# Patient Record
Sex: MALE | Race: WHITE | Employment: FULL TIME | URBAN - METROPOLITAN AREA
[De-identification: names, ages, dates, MRNs, and addresses within clinical notes are randomized per-mention and may not be internally consistent; named-entity substitution may affect disease eponyms.]

---

## 2022-01-27 ENCOUNTER — HOSPITAL ENCOUNTER (EMERGENCY)
Age: 32
Discharge: HOME | End: 2022-01-28
Attending: STUDENT IN AN ORGANIZED HEALTH CARE EDUCATION/TRAINING PROGRAM

## 2022-01-27 ENCOUNTER — APPOINTMENT (OUTPATIENT)
Dept: CT IMAGING | Age: 32
End: 2022-01-27
Attending: PHYSICIAN ASSISTANT

## 2022-01-27 VITALS
OXYGEN SATURATION: 99 % | SYSTOLIC BLOOD PRESSURE: 145 MMHG | TEMPERATURE: 97.7 F | BODY MASS INDEX: 24.52 KG/M2 | HEART RATE: 99 BPM | HEIGHT: 73 IN | DIASTOLIC BLOOD PRESSURE: 89 MMHG | RESPIRATION RATE: 16 BRPM | WEIGHT: 185 LBS

## 2022-01-27 DIAGNOSIS — N13.2 HYDRONEPHROSIS WITH URINARY OBSTRUCTION DUE TO URETERAL CALCULUS: Primary | ICD-10-CM

## 2022-01-27 DIAGNOSIS — N17.9 AKI (ACUTE KIDNEY INJURY) (HCC): ICD-10-CM

## 2022-01-27 LAB
ALBUMIN SERPL-MCNC: 4.3 G/DL (ref 3.4–5)
ALBUMIN/GLOB SERPL: 1 {RATIO} (ref 0.8–1.7)
ALP SERPL-CCNC: 89 U/L (ref 45–117)
ALT SERPL-CCNC: 20 U/L (ref 16–61)
ANION GAP SERPL CALC-SCNC: 6 MMOL/L (ref 3–18)
APPEARANCE UR: ABNORMAL
AST SERPL-CCNC: 10 U/L (ref 10–38)
BACTERIA URNS QL MICRO: ABNORMAL /HPF
BASOPHILS # BLD: 0 K/UL (ref 0–0.1)
BASOPHILS NFR BLD: 0 % (ref 0–2)
BILIRUB SERPL-MCNC: 1.9 MG/DL (ref 0.2–1)
BILIRUB UR QL: NEGATIVE
BUN SERPL-MCNC: 18 MG/DL (ref 7–18)
BUN/CREAT SERPL: 11 (ref 12–20)
CALCIUM SERPL-MCNC: 9.8 MG/DL (ref 8.5–10.1)
CHLORIDE SERPL-SCNC: 100 MMOL/L (ref 100–111)
CO2 SERPL-SCNC: 29 MMOL/L (ref 21–32)
COLOR UR: ABNORMAL
CREAT SERPL-MCNC: 1.7 MG/DL (ref 0.6–1.3)
DIFFERENTIAL METHOD BLD: ABNORMAL
EOSINOPHIL # BLD: 0 K/UL (ref 0–0.4)
EOSINOPHIL NFR BLD: 0 % (ref 0–5)
EPITH CASTS URNS QL MICRO: ABNORMAL /LPF (ref 0–5)
ERYTHROCYTE [DISTWIDTH] IN BLOOD BY AUTOMATED COUNT: 12 % (ref 11.6–14.5)
GLOBULIN SER CALC-MCNC: 4.3 G/DL (ref 2–4)
GLUCOSE SERPL-MCNC: 99 MG/DL (ref 74–99)
GLUCOSE UR STRIP.AUTO-MCNC: NEGATIVE MG/DL
GRAN CASTS URNS QL MICRO: ABNORMAL /LPF
HCT VFR BLD AUTO: 46 % (ref 36–48)
HGB BLD-MCNC: 15.8 G/DL (ref 13–16)
HGB UR QL STRIP: ABNORMAL
HYALINE CASTS URNS QL MICRO: ABNORMAL /LPF (ref 0–2)
IMM GRANULOCYTES # BLD AUTO: 0 K/UL
IMM GRANULOCYTES NFR BLD AUTO: 0 %
KETONES UR QL STRIP.AUTO: 40 MG/DL
LEUKOCYTE ESTERASE UR QL STRIP.AUTO: ABNORMAL
LIPASE SERPL-CCNC: 50 U/L (ref 73–393)
LYMPHOCYTES # BLD: 2 K/UL (ref 0.9–3.6)
LYMPHOCYTES NFR BLD: 12 % (ref 21–52)
MCH RBC QN AUTO: 30.5 PG (ref 24–34)
MCHC RBC AUTO-ENTMCNC: 34.3 G/DL (ref 31–37)
MCV RBC AUTO: 88.8 FL (ref 78–100)
MONOCYTES # BLD: 1.2 K/UL (ref 0.05–1.2)
MONOCYTES NFR BLD: 7 % (ref 3–10)
NEUTS SEG # BLD: 13.7 K/UL (ref 1.8–8)
NEUTS SEG NFR BLD: 81 % (ref 40–73)
NITRITE UR QL STRIP.AUTO: NEGATIVE
NRBC # BLD: 0 K/UL (ref 0–0.01)
NRBC BLD-RTO: 0 PER 100 WBC
PH UR STRIP: 5.5 [PH] (ref 5–8)
PLATELET # BLD AUTO: 305 K/UL (ref 135–420)
PLATELET COMMENTS,PCOM: ABNORMAL
PMV BLD AUTO: 10.2 FL (ref 9.2–11.8)
POTASSIUM SERPL-SCNC: 3.9 MMOL/L (ref 3.5–5.5)
PROT SERPL-MCNC: 8.6 G/DL (ref 6.4–8.2)
PROT UR STRIP-MCNC: ABNORMAL MG/DL
RBC # BLD AUTO: 5.18 M/UL (ref 4.35–5.65)
RBC #/AREA URNS HPF: ABNORMAL /HPF (ref 0–5)
RBC MORPH BLD: ABNORMAL
SARS-COV-2, COV2: NORMAL
SODIUM SERPL-SCNC: 135 MMOL/L (ref 136–145)
SP GR UR REFRACTOMETRY: 1.03 (ref 1–1.03)
UROBILINOGEN UR QL STRIP.AUTO: 1 EU/DL (ref 0.2–1)
WBC # BLD AUTO: 16.9 K/UL (ref 4.6–13.2)
WBC URNS QL MICRO: ABNORMAL /HPF (ref 0–5)

## 2022-01-27 PROCEDURE — 74177 CT ABD & PELVIS W/CONTRAST: CPT

## 2022-01-27 PROCEDURE — 99284 EMERGENCY DEPT VISIT MOD MDM: CPT

## 2022-01-27 PROCEDURE — 74011250636 HC RX REV CODE- 250/636: Performed by: PHYSICIAN ASSISTANT

## 2022-01-27 PROCEDURE — 80053 COMPREHEN METABOLIC PANEL: CPT

## 2022-01-27 PROCEDURE — U0003 INFECTIOUS AGENT DETECTION BY NUCLEIC ACID (DNA OR RNA); SEVERE ACUTE RESPIRATORY SYNDROME CORONAVIRUS 2 (SARS-COV-2) (CORONAVIRUS DISEASE [COVID-19]), AMPLIFIED PROBE TECHNIQUE, MAKING USE OF HIGH THROUGHPUT TECHNOLOGIES AS DESCRIBED BY CMS-2020-01-R: HCPCS

## 2022-01-27 PROCEDURE — 87086 URINE CULTURE/COLONY COUNT: CPT

## 2022-01-27 PROCEDURE — 83690 ASSAY OF LIPASE: CPT

## 2022-01-27 PROCEDURE — 74011000636 HC RX REV CODE- 636: Performed by: STUDENT IN AN ORGANIZED HEALTH CARE EDUCATION/TRAINING PROGRAM

## 2022-01-27 PROCEDURE — 87077 CULTURE AEROBIC IDENTIFY: CPT

## 2022-01-27 PROCEDURE — 85025 COMPLETE CBC W/AUTO DIFF WBC: CPT

## 2022-01-27 PROCEDURE — 74011000258 HC RX REV CODE- 258: Performed by: PHYSICIAN ASSISTANT

## 2022-01-27 PROCEDURE — 87186 SC STD MICRODIL/AGAR DIL: CPT

## 2022-01-27 PROCEDURE — 96365 THER/PROPH/DIAG IV INF INIT: CPT

## 2022-01-27 PROCEDURE — 81001 URINALYSIS AUTO W/SCOPE: CPT

## 2022-01-27 PROCEDURE — 74011000250 HC RX REV CODE- 250: Performed by: PHYSICIAN ASSISTANT

## 2022-01-27 PROCEDURE — 96375 TX/PRO/DX INJ NEW DRUG ADDON: CPT

## 2022-01-27 PROCEDURE — 96376 TX/PRO/DX INJ SAME DRUG ADON: CPT

## 2022-01-27 RX ORDER — KETOROLAC TROMETHAMINE 15 MG/ML
15 INJECTION, SOLUTION INTRAMUSCULAR; INTRAVENOUS
Status: COMPLETED | OUTPATIENT
Start: 2022-01-27 | End: 2022-01-27

## 2022-01-27 RX ORDER — DICYCLOMINE HYDROCHLORIDE 10 MG/1
10 CAPSULE ORAL
Status: DISCONTINUED | OUTPATIENT
Start: 2022-01-27 | End: 2022-01-28 | Stop reason: HOSPADM

## 2022-01-27 RX ORDER — MORPHINE SULFATE 4 MG/ML
4 INJECTION INTRAVENOUS
Status: COMPLETED | OUTPATIENT
Start: 2022-01-27 | End: 2022-01-27

## 2022-01-27 RX ORDER — ONDANSETRON 2 MG/ML
4 INJECTION INTRAMUSCULAR; INTRAVENOUS
Status: COMPLETED | OUTPATIENT
Start: 2022-01-27 | End: 2022-01-27

## 2022-01-27 RX ADMIN — MORPHINE SULFATE 4 MG: 4 INJECTION INTRAVENOUS at 22:22

## 2022-01-27 RX ADMIN — LIDOCAINE HYDROCHLORIDE 80 MG: 20 INJECTION, SOLUTION EPIDURAL; INFILTRATION; INTRACAUDAL; PERINEURAL at 23:41

## 2022-01-27 RX ADMIN — KETOROLAC TROMETHAMINE 15 MG: 15 INJECTION, SOLUTION INTRAMUSCULAR; INTRAVENOUS at 19:20

## 2022-01-27 RX ADMIN — SODIUM CHLORIDE 1000 ML: 900 INJECTION, SOLUTION INTRAVENOUS at 19:05

## 2022-01-27 RX ADMIN — ONDANSETRON 4 MG: 2 INJECTION INTRAMUSCULAR; INTRAVENOUS at 19:20

## 2022-01-27 RX ADMIN — IOPAMIDOL 70 ML: 612 INJECTION, SOLUTION INTRAVENOUS at 22:54

## 2022-01-27 RX ADMIN — MORPHINE SULFATE 4 MG: 4 INJECTION INTRAVENOUS at 20:12

## 2022-01-27 NOTE — ED PROVIDER NOTES
EMERGENCY DEPARTMENT HISTORY AND PHYSICAL EXAM    Date: 1/27/2022  Patient Name: Abiodun Lucero    History of Presenting Illness     Chief Complaint   Patient presents with    Abdominal Pain         History Provided By: Patient    6:30 PM  Abiodun Lucero is a 27 y.o. male who presents to the emergency department C/O left-sided abdominal pain which began 2 days ago. Had several bouts of nausea and vomiting yesterday. Had minor cough today. No prior abdominal surgeries. Pt denies fever, diarrhea, dysuria, chest pain, shortness of breath, and any other sxs or complaints. PCP: None        Past History     Past Medical History:  History reviewed. No pertinent past medical history. Past Surgical History:  History reviewed. No pertinent surgical history. Family History:  History reviewed. No pertinent family history. Social History:  Social History     Tobacco Use    Smoking status: Never Smoker    Smokeless tobacco: Never Used   Substance Use Topics    Alcohol use: Not Currently    Drug use: Yes     Types: Marijuana       Allergies:  No Known Allergies      Review of Systems   Review of Systems   Constitutional: Negative for fever. Respiratory: Positive for cough. Cardiovascular: Negative for chest pain. Gastrointestinal: Positive for abdominal pain, nausea and vomiting. Negative for diarrhea. Genitourinary: Negative for dysuria. All other systems reviewed and are negative. Physical Exam     Vitals:    01/27/22 1813 01/27/22 1947   BP: (!) 145/89    Pulse: 99    Resp: 16    Temp: 97.7 °F (36.5 °C)    SpO2: 98% 99%   Weight: 83.9 kg (185 lb)    Height: 6' 1\" (1.854 m)      Physical Exam  Vital signs and nursing notes reviewed. CONSTITUTIONAL: Alert. Well-appearing; well-nourished; appears uncomfortable, but not in distress. HEAD: Normocephalic; atraumatic. CV: Normal S1, S2; no murmurs, rubs, or gallops. No chest wall tenderness.   RESPIRATORY: Normal chest excursion with respiration; breath sounds clear and equal bilaterally; no wheezes, rhonchi, or rales. GI: Normal bowel sounds; non-distended; mild epigastric, periumbilical and left-sided abdominal tenderness; no pain McBurney's point. Negative Cardona sign. No guarding or rigidity; no palpable organomegaly. No CVA tenderness. NEURO: A & O x3. PSYCH:  Mood and affect appropriate. Diagnostic Study Results     Labs -     Recent Results (from the past 12 hour(s))   URINALYSIS W/ RFLX MICROSCOPIC    Collection Time: 01/27/22  6:30 PM   Result Value Ref Range    Color DARK YELLOW      Appearance CLOUDY      Specific gravity 1.028 1.005 - 1.030      pH (UA) 5.5 5.0 - 8.0      Protein TRACE (A) NEG mg/dL    Glucose Negative NEG mg/dL    Ketone 40 (A) NEG mg/dL    Bilirubin Negative NEG      Blood LARGE (A) NEG      Urobilinogen 1.0 0.2 - 1.0 EU/dL    Nitrites Negative NEG      Leukocyte Esterase SMALL (A) NEG     URINE MICROSCOPIC ONLY    Collection Time: 01/27/22  6:30 PM   Result Value Ref Range    WBC 4 to 10 0 - 5 /hpf    RBC 11 to 20 0 - 5 /hpf    Epithelial cells FEW 0 - 5 /lpf    Bacteria 1+ (A) NEG /hpf    Hyaline cast 0 to 3 0 - 2 /lpf    Granular cast 0 to 3 NEG /lpf   CBC WITH AUTOMATED DIFF    Collection Time: 01/27/22  6:50 PM   Result Value Ref Range    WBC 16.9 (H) 4.6 - 13.2 K/uL    RBC 5.18 4.35 - 5.65 M/uL    HGB 15.8 13.0 - 16.0 g/dL    HCT 46.0 36.0 - 48.0 %    MCV 88.8 78.0 - 100.0 FL    MCH 30.5 24.0 - 34.0 PG    MCHC 34.3 31.0 - 37.0 g/dL    RDW 12.0 11.6 - 14.5 %    PLATELET 094 663 - 060 K/uL    MPV 10.2 9.2 - 11.8 FL    NRBC 0.0 0  WBC    ABSOLUTE NRBC 0.00 0.00 - 0.01 K/uL    NEUTROPHILS 81 (H) 40 - 73 %    LYMPHOCYTES 12 (L) 21 - 52 %    MONOCYTES 7 3 - 10 %    EOSINOPHILS 0 0 - 5 %    BASOPHILS 0 0 - 2 %    IMMATURE GRANULOCYTES 0 %    ABS. NEUTROPHILS 13.7 (H) 1.8 - 8.0 K/UL    ABS. LYMPHOCYTES 2.0 0.9 - 3.6 K/UL    ABS. MONOCYTES 1.2 0.05 - 1.2 K/UL    ABS.  EOSINOPHILS 0.0 0.0 - 0.4 K/UL    ABS. BASOPHILS 0.0 0.0 - 0.1 K/UL    ABS. IMM. GRANS. 0.0 K/UL    DF MANUAL      PLATELET COMMENTS ADEQUATE PLATELETS      RBC COMMENTS NORMOCYTIC, NORMOCHROMIC     METABOLIC PANEL, COMPREHENSIVE    Collection Time: 01/27/22  6:50 PM   Result Value Ref Range    Sodium 135 (L) 136 - 145 mmol/L    Potassium 3.9 3.5 - 5.5 mmol/L    Chloride 100 100 - 111 mmol/L    CO2 29 21 - 32 mmol/L    Anion gap 6 3.0 - 18 mmol/L    Glucose 99 74 - 99 mg/dL    BUN 18 7.0 - 18 MG/DL    Creatinine 1.70 (H) 0.6 - 1.3 MG/DL    BUN/Creatinine ratio 11 (L) 12 - 20      GFR est AA 57 (L) >60 ml/min/1.73m2    GFR est non-AA 47 (L) >60 ml/min/1.73m2    Calcium 9.8 8.5 - 10.1 MG/DL    Bilirubin, total 1.9 (H) 0.2 - 1.0 MG/DL    ALT (SGPT) 20 16 - 61 U/L    AST (SGOT) 10 10 - 38 U/L    Alk. phosphatase 89 45 - 117 U/L    Protein, total 8.6 (H) 6.4 - 8.2 g/dL    Albumin 4.3 3.4 - 5.0 g/dL    Globulin 4.3 (H) 2.0 - 4.0 g/dL    A-G Ratio 1.0 0.8 - 1.7     LIPASE    Collection Time: 01/27/22  6:50 PM   Result Value Ref Range    Lipase 50 (L) 73 - 393 U/L   SARS-COV-2    Collection Time: 01/27/22 10:41 PM   Result Value Ref Range    SARS-CoV-2 Please find results under separate order         Radiologic Studies -   CT ABD PELV W CONT   Final Result      4 mm obstructing calculus at the left ureterovesical junction with resultant   moderate hydroureteronephrosis. CT Results  (Last 48 hours)               01/27/22 2311  CT ABD PELV W CONT Final result    Impression:      4 mm obstructing calculus at the left ureterovesical junction with resultant   moderate hydroureteronephrosis. Narrative:  EXAM: CT of the abdomen and pelvis       CLINICAL INDICATION/HISTORY:  Left-sided abdominal pain. COMPARISON: None. TECHNIQUE: Axial CT imaging of the abdomen and pelvis was performed with   intravenous contrast. Multiplanar reformats were generated.        One or more dose reduction techniques were used on this CT: automated exposure   control, adjustment of the mAs and/or kVp according to patient size, and   iterative reconstruction techniques. The specific techniques used on this CT   exam have been documented in the patient's electronic medical record. Digital   Imaging and Communications in Medicine (DICOM) format image data are available   to nonaffiliated external healthcare facilities or entities on a secure, media   free, reciprocally searchable basis with patient authorization for at least a   12-month period after this study. _______________       FINDINGS:       LOWER CHEST: The visualized lung bases are clear. Heart size is normal. There is   no pericardial or pleural effusion. LIVER, BILIARY: Liver is normal with no focal parenchymal lesion identified. There is no intra-or extrahepatic biliary ductal dilatation. Gallbladder is   unremarkable. PANCREAS: Normal.       SPLEEN: Normal.       ADRENALS: Normal.       KIDNEYS: 4 mm obstructing calculus at the left ureterovesical junction with   resultant moderate left hydroureteronephrosis. There are small bilateral renal   cortical cyst which requires no dedicated follow-up is these have typically   benign characteristics. LYMPH NODES: No enlarged lymph nodes. GASTROINTESTINAL TRACT: No bowel dilation or wall thickening. The appendix is   visualized and unremarkable. PELVIC ORGANS: Unremarkable. VASCULATURE: Unremarkable. BONES: No acute or aggressive osseous abnormalities identified. OTHER: There is no free intraperitoneal fluid or free air.        SUPERFICIAL SOFT TISSUES: Unremarkable.       _______________               CXR Results  (Last 48 hours)    None          Medications given in the ED-  Medications   dicyclomine (BENTYL) capsule 10 mg (0 mg Oral Held 1/27/22 2320)   sodium chloride 0.9 % bolus infusion 1,000 mL (0 mL IntraVENous IV Completed 1/27/22 2239)   ondansetron (ZOFRAN) injection 4 mg (4 mg IntraVENous Given 1/27/22 1920)   ketorolac (TORADOL) injection 15 mg (15 mg IntraVENous Given 1/27/22 1920)   morphine injection 4 mg (4 mg IntraVENous Given 1/27/22 2012)   morphine injection 4 mg (4 mg IntraVENous Given 1/27/22 2222)   iopamidoL (ISOVUE 300) 61 % contrast injection 70 mL (70 mL IntraVENous Given 1/27/22 2254)   lidocaine (PF) (XYLOCAINE) 1 mg/kg = 80 mg in 0.9% sodium chloride 50 mL IVPB (80 mg IntraVENous New Bag 1/27/22 2341)         Medical Decision Making   I am the first provider for this patient. I reviewed the vital signs, available nursing notes, past medical history, past surgical history, family history and social history. Vital Signs-Reviewed the patient's vital signs. Records Reviewed: Nursing Notes      Procedures:  Procedures    ED Course:  6:30 PM   Initial assessment performed. The patients presenting problems have been discussed, and they are in agreement with the care plan formulated and outlined with them. I have encouraged them to ask questions as they arise throughout their visit. 11:28 PM progress note  Patient states his pain is worse again, rating it a 7/10 despite Toradol and morphine x2. CT shows 4 mm stone at the left UVJ, causing moderate left hydronephrosis. Will give IV lidocaine and reassess. 12:20 AM progress note  Patient feels much better with IV lidocaine. States his pain is down to a 4 and he feels that it is tolerable enough to go home. Will discharge with prescriptions for Zofran, Flomax and Norco to use as needed for pain. Patient provided urology follow-up information as well as strainer to catch stone. At 4 mm he has high likelihood of passing this but understands to return to ED if he has worsening pain, unable to tolerate p.o. or fever. Diagnosis and Disposition       DISCHARGE NOTE:    Angelo Peacock's  results have been reviewed with him. He has been counseled regarding his diagnosis, treatment, and plan.   He verbally conveys understanding and agreement of the signs, symptoms, diagnosis, treatment and prognosis and additionally agrees to follow up as discussed. He also agrees with the care-plan and conveys that all of his questions have been answered. I have also provided discharge instructions for him that include: educational information regarding their diagnosis and treatment, and list of reasons why they would want to return to the ED prior to their follow-up appointment, should his condition change. He has been provided with education for proper emergency department utilization. CLINICAL IMPRESSION:    1. Hydronephrosis with urinary obstruction due to ureteral calculus    2. KHOI (acute kidney injury) ([facility] [address]        PLAN:  1. D/C Home  2. Current Discharge Medication List      START taking these medications    Details   ondansetron (ZOFRAN ODT) 4 mg disintegrating tablet Take 1 Tablet by mouth every eight (8) hours as needed for Nausea or Vomiting. Qty: 8 Tablet, Refills: 0  Start date: 1/28/2022      tamsulosin (Flomax) 0.4 mg capsule Take 1 Capsule by mouth daily for 10 days. Qty: 10 Capsule, Refills: 0  Start date: 1/28/2022, End date: 2/7/2022      HYDROcodone-acetaminophen (Norco) 5-325 mg per tablet Take 1 Tablet by mouth every six (6) hours as needed for Pain for up to 3 days. Max Daily Amount: 4 Tablets. Qty: 12 Tablet, Refills: 0  Start date: 1/28/2022, End date: 1/31/2022    Associated Diagnoses: Hydronephrosis with urinary obstruction due to ureteral calculus           3.    Follow-up Information     Follow up With Specialties Details Why Contact Info    Your PCP or Urologist  Schedule an appointment as soon as possible for a visit       Vianney Mata MD Urology Schedule an appointment as soon as possible for a visit   [address]      [facility] EMERGENCY DEPT Emergency Medicine  As needed, If symptoms worsen [address] [place] [address]  [phone]        _______________________________      Please note that this dictation was completed with [employer], the computer voice recognition software. Quite often unanticipated grammatical, syntax, homophones, and other interpretive errors are inadvertently transcribed by the computer software. Please disregard these errors. Please excuse any errors that have escaped final proofreading.

## 2022-01-28 LAB — SARS-COV-2, NAA: NOT DETECTED

## 2022-01-28 RX ORDER — TAMSULOSIN HYDROCHLORIDE 0.4 MG/1
0.4 CAPSULE ORAL DAILY
Qty: 10 CAPSULE | Refills: 0 | Status: SHIPPED | OUTPATIENT
Start: 2022-01-28 | End: 2022-02-07

## 2022-01-28 RX ORDER — ONDANSETRON 4 MG/1
4 TABLET, ORALLY DISINTEGRATING ORAL
Qty: 8 TABLET | Refills: 0 | Status: SHIPPED | OUTPATIENT
Start: 2022-01-28

## 2022-01-28 RX ORDER — HYDROCODONE BITARTRATE AND ACETAMINOPHEN 5; 325 MG/1; MG/1
1 TABLET ORAL
Qty: 12 TABLET | Refills: 0 | Status: SHIPPED | OUTPATIENT
Start: 2022-01-28 | End: 2022-01-31

## 2022-01-31 LAB
BACTERIA SPEC CULT: ABNORMAL
CC UR VC: ABNORMAL
SERVICE CMNT-IMP: ABNORMAL

## 2022-01-31 NOTE — CALL BACK NOTE
Urine culture grew 11,000 strep mitis/oralis, recommend treating with antibiotics such as Keflex based on sensitivity. Needs notified of result and need for antibiotics. No answer, Voicemail left to return call.

## 2022-02-01 RX ORDER — PENICILLIN V POTASSIUM 500 MG/1
500 TABLET, FILM COATED ORAL 4 TIMES DAILY
Qty: 40 TABLET | Refills: 0 | Status: SHIPPED | OUTPATIENT
Start: 2022-02-01 | End: 2022-02-11

## 2022-02-01 NOTE — CALL BACK NOTE
2:27 PM  Patient. Informed him of his test results. Spoke with pharmacy to confirm penicillin V K would be appropriate substitute and sent to preferred pharmacy. - aliyah, carolin